# Patient Record
Sex: FEMALE | Race: WHITE | Employment: OTHER | URBAN - METROPOLITAN AREA
[De-identification: names, ages, dates, MRNs, and addresses within clinical notes are randomized per-mention and may not be internally consistent; named-entity substitution may affect disease eponyms.]

---

## 2017-02-10 RX ORDER — OXYBUTYNIN CHLORIDE 10 MG/1
TABLET, EXTENDED RELEASE ORAL
Qty: 30 TABLET | Refills: 0 | Status: SHIPPED | OUTPATIENT
Start: 2017-02-10 | End: 2017-03-24

## 2017-02-10 NOTE — TELEPHONE ENCOUNTER
Requesting Oxybutynin   LOV: 11/22/16 for pre op  RTC: post surgery   Last Labs: n/a   Filled: 7/6/16 #30 with 5 refills    Future Appointments  Date Time Provider Haleigh Devine   3/15/2017 11:30 AM Jayla Lemus MD [facility] [address] [facility] [address]       Med refilled

## 2017-03-15 ENCOUNTER — OFFICE VISIT (OUTPATIENT)
Dept: FAMILY MEDICINE CLINIC | Facility: CLINIC | Age: 67
End: 2017-03-15

## 2017-03-15 VITALS
HEART RATE: 72 BPM | WEIGHT: 137.38 LBS | SYSTOLIC BLOOD PRESSURE: 110 MMHG | BODY MASS INDEX: 24.34 KG/M2 | RESPIRATION RATE: 16 BRPM | TEMPERATURE: 97 F | HEIGHT: 63 IN | DIASTOLIC BLOOD PRESSURE: 62 MMHG

## 2017-03-15 DIAGNOSIS — N39.46 MIXED INCONTINENCE URGE AND STRESS: ICD-10-CM

## 2017-03-15 DIAGNOSIS — Z72.0 TOBACCO ABUSE: ICD-10-CM

## 2017-03-15 DIAGNOSIS — Z28.21 INFLUENZA VACCINATION DECLINED: Primary | ICD-10-CM

## 2017-03-15 DIAGNOSIS — R45.89 DEPRESSED AFFECT: ICD-10-CM

## 2017-03-15 DIAGNOSIS — E78.2 MIXED HYPERLIPIDEMIA: ICD-10-CM

## 2017-03-15 DIAGNOSIS — Z98.890 HISTORY OF BACK SURGERY: ICD-10-CM

## 2017-03-15 DIAGNOSIS — R20.0 NUMBNESS AND TINGLING: ICD-10-CM

## 2017-03-15 DIAGNOSIS — Z12.11 SCREENING FOR COLON CANCER: ICD-10-CM

## 2017-03-15 DIAGNOSIS — E53.8 LOW VITAMIN B12 LEVEL: ICD-10-CM

## 2017-03-15 DIAGNOSIS — E55.9 VITAMIN D DEFICIENCY: ICD-10-CM

## 2017-03-15 DIAGNOSIS — H52.201 ASTIGMATISM OF RIGHT EYE: ICD-10-CM

## 2017-03-15 DIAGNOSIS — R20.2 NUMBNESS AND TINGLING: ICD-10-CM

## 2017-03-15 DIAGNOSIS — Z00.00 ANNUAL PHYSICAL EXAM: ICD-10-CM

## 2017-03-15 DIAGNOSIS — I83.90 VARICOSE VEIN OF LEG: ICD-10-CM

## 2017-03-15 DIAGNOSIS — G89.29 CHRONIC MIDLINE LOW BACK PAIN WITH SCIATICA, SCIATICA LATERALITY UNSPECIFIED: ICD-10-CM

## 2017-03-15 DIAGNOSIS — M54.40 CHRONIC MIDLINE LOW BACK PAIN WITH SCIATICA, SCIATICA LATERALITY UNSPECIFIED: ICD-10-CM

## 2017-03-15 PROCEDURE — 96160 PT-FOCUSED HLTH RISK ASSMT: CPT | Performed by: INTERNAL MEDICINE

## 2017-03-15 NOTE — PROGRESS NOTES
Dl Ren is a 74year old female who presents for a MA Supervisit.         Patient Care Team: Patient Care Team:  Severiano Sing, MD as PCP - General (Internal Medicine)  Kira Chacon MD as Consulting Physician ([address] [place] [address])  Micheal Granado MD (Internal Med 204* 01/25/2016   CHOLEST 181 02/13/2015       Lab Results  Component Value Date   HDL 54 01/25/2016   HDL 44* 02/13/2015       Lab Results  Component Value Date   TRIG 78 01/25/2016   TRIG 79 02/13/2015       Lab Results  Component Value Date   * 0 activities? : No    Memory Problems?: Yes (due to stress)      Fall/Risk Assessment     Do you have 3 or more medical conditions?: 1-Yes    Have you fallen in the last 12 months?: 0-No    Do you accidently lose urine?: 0-No    Do you have difficulty seeing every 10 years Colonoscopy,10 Years due on 03/29/1998 Update Health Maintenance if applicable    Flex Sigmoidoscopy Screen every 5 years No results found for this or any previous visit. No flowsheet data found.      Fecal Occult Blood Annually No results fo Date Value   11/22/2016 0.81   01/25/2016 0.83    No flowsheet data found. Digoxin Serum Conc  Annually No results found for: DIGOXIN No flowsheet data found.     Diabetes      HgbA1C  Annually HEMOGLOBIN A1C (% of total Hgb)   Date Value   02/13/2015 family history of Pyloic Stenosis in siblings      SOCIAL HISTORY:     Smoking Status: Current Every Day Smoker        Packs/Day: 0.50  Years:           Types: Cigarettes    Smokeless Status: Never Used                        Comment: 1 pack every 4 days n grossly intact    ASSESSMENT AND OTHER RELEVANT CHRONIC CONDITIONS:   Gisela Del Valle is a 74year old female who presents for a Medicare Assessment.      PLAN SUMMARY:   Diagnoses and all orders for this visit:    Influenza vaccination declined  -     Flu Helen Paredes Pneumococcal, Zoster, Tetanus     Immunization History   Administered Date(s) Administered   • TDAP 01/25/2016

## 2017-03-17 NOTE — PATIENT INSTRUCTIONS
Gisela LORRAINE Browning's SCREENING SCHEDULE   Tests on this list are recommended by your physician but may not be covered, or covered at this frequency, by your insurer. Please check with your insurance carrier before scheduling to verify coverage.    PREVENTATIVE SE to Age 76     Colonoscopy Screen   Covered every 10 years- more often if abnormal Colonoscopy,10 Years due on 03/29/1998 Update Health Maintenance if applicable    Flex Sigmoidoscopy Screen  Covered every 5 years No results found for this or any previous v Pneumococcal 13 (Prevnar)  Covered Once after 65 No orders found for this or any previous visit. Please get once after your 65th birthday    Pneumococcal 23 (Pneumovax)  Covered Once after 65 No orders found for this or any previous visit.  Please get once

## 2017-03-24 NOTE — TELEPHONE ENCOUNTER
Requesting Oxybutynin Chloride ER  LOV: 3/15/17  RTC: 6mo  Last Labs:   Filled: 2/10/17 #30 with 0 refills    No future appointments.     Pended if okay to refill

## 2017-03-27 RX ORDER — OXYBUTYNIN CHLORIDE 10 MG/1
TABLET, EXTENDED RELEASE ORAL
Qty: 30 TABLET | Refills: 0 | Status: SHIPPED | OUTPATIENT
Start: 2017-03-27 | End: 2017-05-16

## 2017-03-28 ENCOUNTER — APPOINTMENT (OUTPATIENT)
Dept: LAB | Facility: HOSPITAL | Age: 67
End: 2017-03-28
Attending: INTERNAL MEDICINE
Payer: MEDICARE

## 2017-03-28 DIAGNOSIS — Z12.11 SCREENING FOR COLON CANCER: ICD-10-CM

## 2017-03-28 PROCEDURE — 82272 OCCULT BLD FECES 1-3 TESTS: CPT

## 2017-03-31 NOTE — TELEPHONE ENCOUNTER
Received a refill requested (3rd request) for Oxybutynin ER 10mg #30.  Per refill below it was sent on 3/27/17 for #30, called [facility] spoke with Ivelisse Negron she states they never received it, since it was approved by Dr. Lennette Bosworth on 3/27/17 a verbal was given for

## 2017-05-17 NOTE — TELEPHONE ENCOUNTER
Requesting oxybutynin and Duloxetine  LOV: 3/15/17  RTC: 6 months  Last Labs: n/a  Filled: 3/27/17 #30 with 0 refills Oxybutynin  Filled: 7/6/16 #30 with 5 refills of duloxetine    No future appointments.     Patient said she has not been taking Duloxetine

## 2017-05-18 RX ORDER — DULOXETIN HYDROCHLORIDE 30 MG/1
CAPSULE, DELAYED RELEASE ORAL
Qty: 30 CAPSULE | Refills: 5 | Status: SHIPPED | OUTPATIENT
Start: 2017-05-18

## 2017-05-18 RX ORDER — OXYBUTYNIN CHLORIDE 10 MG/1
TABLET, EXTENDED RELEASE ORAL
Qty: 30 TABLET | Refills: 5 | Status: SHIPPED | OUTPATIENT
Start: 2017-05-18

## 2017-08-09 ENCOUNTER — PATIENT OUTREACH (OUTPATIENT)
Dept: FAMILY MEDICINE CLINIC | Facility: CLINIC | Age: 67
End: 2017-08-09

## 2017-08-09 NOTE — PROGRESS NOTES
Patient is due for a mammogram. LM for patient to call back to discuss.     Time started: 3:04    Time ended: 3:06    Total time spent on chart: 2 min

## 2018-06-13 ENCOUNTER — PATIENT OUTREACH (OUTPATIENT)
Dept: FAMILY MEDICINE CLINIC | Facility: CLINIC | Age: 68
End: 2018-06-13

## 2018-10-18 ENCOUNTER — PATIENT OUTREACH (OUTPATIENT)
Dept: FAMILY MEDICINE CLINIC | Facility: CLINIC | Age: 68
End: 2018-10-18

## 2019-01-25 ENCOUNTER — TELEPHONE (OUTPATIENT)
Dept: FAMILY MEDICINE CLINIC | Facility: CLINIC | Age: 69
End: 2019-01-25

## 2019-02-28 ENCOUNTER — TELEPHONE (OUTPATIENT)
Dept: FAMILY MEDICINE CLINIC | Facility: CLINIC | Age: 69
End: 2019-02-28

## 2019-03-08 ENCOUNTER — TELEPHONE (OUTPATIENT)
Dept: FAMILY MEDICINE CLINIC | Facility: CLINIC | Age: 69
End: 2019-03-08

## 2019-03-08 NOTE — TELEPHONE ENCOUNTER
Called patient and l/m vc mail. Needs physical and colorectal screening.  Lluvia has left 2 messages and sent msg via my chart

## 2019-04-05 ENCOUNTER — TELEPHONE (OUTPATIENT)
Dept: FAMILY MEDICINE CLINIC | Facility: CLINIC | Age: 69
End: 2019-04-05

## 2019-04-05 NOTE — TELEPHONE ENCOUNTER
We received returned certified mail with a new address in [place]. I updated the address in reg.

## 2024-07-03 ENCOUNTER — APPOINTMENT (OUTPATIENT)
Dept: PRIMARY CARE | Facility: CLINIC | Age: 74
End: 2024-07-03
Payer: MEDICARE

## 2024-07-03 VITALS
TEMPERATURE: 97.1 F | SYSTOLIC BLOOD PRESSURE: 130 MMHG | HEART RATE: 98 BPM | OXYGEN SATURATION: 84 % | DIASTOLIC BLOOD PRESSURE: 68 MMHG | WEIGHT: 124 LBS

## 2024-07-03 DIAGNOSIS — E78.2 MIXED HYPERLIPIDEMIA: ICD-10-CM

## 2024-07-03 DIAGNOSIS — N32.81 OVERACTIVE BLADDER: ICD-10-CM

## 2024-07-03 DIAGNOSIS — Z00.00 MEDICARE ANNUAL WELLNESS VISIT, SUBSEQUENT: ICD-10-CM

## 2024-07-03 DIAGNOSIS — I73.9 PERIPHERAL VASCULAR DISEASE, UNSPECIFIED (CMS-HCC): ICD-10-CM

## 2024-07-03 DIAGNOSIS — F32.0 MILD MAJOR DEPRESSION (CMS-HCC): ICD-10-CM

## 2024-07-03 DIAGNOSIS — G30.1 MODERATE LATE ONSET ALZHEIMER'S DEMENTIA WITHOUT BEHAVIORAL DISTURBANCE, PSYCHOTIC DISTURBANCE, MOOD DISTURBANCE, OR ANXIETY (MULTI): Primary | ICD-10-CM

## 2024-07-03 DIAGNOSIS — F02.B0 MODERATE LATE ONSET ALZHEIMER'S DEMENTIA WITHOUT BEHAVIORAL DISTURBANCE, PSYCHOTIC DISTURBANCE, MOOD DISTURBANCE, OR ANXIETY (MULTI): Primary | ICD-10-CM

## 2024-07-03 PROBLEM — T14.8XXA BRUISING: Status: RESOLVED | Noted: 2023-07-12 | Resolved: 2024-07-03

## 2024-07-03 PROBLEM — R29.6 FREQUENT FALLS: Status: RESOLVED | Noted: 2023-07-12 | Resolved: 2024-07-03

## 2024-07-03 PROBLEM — S46.911A MUSCLE STRAIN OF RIGHT UPPER ARM: Status: RESOLVED | Noted: 2024-07-03 | Resolved: 2024-07-03

## 2024-07-03 PROBLEM — F32.A DEPRESSION: Status: ACTIVE | Noted: 2024-07-03

## 2024-07-03 PROBLEM — R29.898 RIGHT ARM WEAKNESS: Status: RESOLVED | Noted: 2024-07-03 | Resolved: 2024-07-03

## 2024-07-03 PROBLEM — J43.2 CENTRILOBULAR EMPHYSEMA (MULTI): Status: RESOLVED | Noted: 2024-02-01 | Resolved: 2024-07-03

## 2024-07-03 PROBLEM — L98.9 HEEL SORE: Status: RESOLVED | Noted: 2024-04-26 | Resolved: 2024-07-03

## 2024-07-03 PROBLEM — H52.201 ASTIGMATISM OF RIGHT EYE: Status: ACTIVE | Noted: 2017-03-15

## 2024-07-03 PROBLEM — D69.2 OTHER NONTHROMBOCYTOPENIC PURPURA (CMS-HCC): Status: RESOLVED | Noted: 2023-07-12 | Resolved: 2024-07-03

## 2024-07-03 PROBLEM — E55.9 VITAMIN D DEFICIENCY: Status: ACTIVE | Noted: 2017-03-15

## 2024-07-03 PROBLEM — D69.2 OTHER NONTHROMBOCYTOPENIC PURPURA (CMS-HCC): Status: ACTIVE | Noted: 2023-07-12

## 2024-07-03 PROCEDURE — 99213 OFFICE O/P EST LOW 20 MIN: CPT | Performed by: FAMILY MEDICINE

## 2024-07-03 PROCEDURE — 1170F FXNL STATUS ASSESSED: CPT | Performed by: FAMILY MEDICINE

## 2024-07-03 PROCEDURE — 1159F MED LIST DOCD IN RCRD: CPT | Performed by: FAMILY MEDICINE

## 2024-07-03 PROCEDURE — 1160F RVW MEDS BY RX/DR IN RCRD: CPT | Performed by: FAMILY MEDICINE

## 2024-07-03 PROCEDURE — 4004F PT TOBACCO SCREEN RCVD TLK: CPT | Performed by: FAMILY MEDICINE

## 2024-07-03 PROCEDURE — G0439 PPPS, SUBSEQ VISIT: HCPCS | Performed by: FAMILY MEDICINE

## 2024-07-03 PROCEDURE — 1124F ACP DISCUSS-NO DSCNMKR DOCD: CPT | Performed by: FAMILY MEDICINE

## 2024-07-03 RX ORDER — OXYBUTYNIN CHLORIDE 10 MG/1
1 TABLET, EXTENDED RELEASE ORAL DAILY
COMMUNITY
Start: 2017-05-18

## 2024-07-03 RX ORDER — MEMANTINE HYDROCHLORIDE 10 MG/1
10 TABLET ORAL 2 TIMES DAILY
Qty: 180 TABLET | Refills: 3 | Status: SHIPPED | OUTPATIENT
Start: 2024-07-03 | End: 2025-07-03

## 2024-07-03 RX ORDER — DONEPEZIL HYDROCHLORIDE 10 MG/1
10 TABLET, FILM COATED ORAL NIGHTLY
Qty: 90 TABLET | Refills: 3 | Status: SHIPPED | OUTPATIENT
Start: 2024-07-03 | End: 2025-07-03

## 2024-07-03 ASSESSMENT — ACTIVITIES OF DAILY LIVING (ADL)
DOING_HOUSEWORK: TOTAL CARE
GROCERY_SHOPPING: TOTAL CARE
MANAGING_FINANCES: TOTAL CARE
TAKING_MEDICATION: TOTAL CARE
DRESSING: INDEPENDENT
BATHING: INDEPENDENT

## 2024-07-03 ASSESSMENT — PATIENT HEALTH QUESTIONNAIRE - PHQ9
SUM OF ALL RESPONSES TO PHQ9 QUESTIONS 1 AND 2: 1
2. FEELING DOWN, DEPRESSED OR HOPELESS: SEVERAL DAYS
1. LITTLE INTEREST OR PLEASURE IN DOING THINGS: NOT AT ALL

## 2024-07-03 NOTE — PROGRESS NOTES
Subjective   Reason for Visit: Dorothy Chan is an 74 y.o. female here for a Medicare Wellness visit.     Past Medical, Surgical, and Family History reviewed and updated in chart.    Reviewed all medications by prescribing practitioner or clinical pharmacist (such as prescriptions, OTCs, herbal therapies and supplements) and documented in the medical record.    Very pleasant 74-year-old with moderate severe dementia has had for several years strong family history her brother sister and parents all suffer from the disease started developing several years ago she is now full 24-hour care her daughter is her main caregiver Rosario and is trying to keep her at home  That she resists bathing has not been wandering is unable to cook her communication is very poor and she is not recognizing some family members she gets a little combative when they are encouraging her to get in the shower for hygiene she is not she is a smoker and has started doing that again since her memory has gone bad family is asking if there is any medication to help to possibly maintain function  She has a history of hyperlipidemia but has not been complaining of any chest pain chest tightness shortness breath or exertional symptoms does have overactive bladder has had that since menopause and has gotten significantly worse since she has been being treated for the dementia was since the onset of the dementia has gotten significantly worse running to the bathroom frequently        Patient Self Assessment of Health Status  Patient Self Assessment: Fair    Nutrition and Exercise  Current Diet: Unhealthy Diet  Adequate Fluid Intake: No  Caffeine: No  Exercise Frequency: No Exercise    Functional Ability/Level of Safety  Cognitive Impairment Observed: Cognitive impairment observed  Cognitive Impairment Reported: Cognitive impairment reported by patient or family    Home Safety Risk Factors: None         Patient Care Team:  Lidia Soto MD as PCP - General  (Family Medicine)     Review of Systems  Constitutional: no chills, no fever and no night sweats.   Eyes: no blurred vision and no eyesight problems.   ENT: no hearing loss, no nasal congestion, no nasal discharge, no hoarseness and no sore throat.   Cardiovascular: no chest pain, no intermittent leg claudication, no lower extremity edema, no palpitations and no syncope.   Respiratory: no cough, no shortness of breath during exertion, no shortness of breath at rest and no wheezing.   Gastrointestinal: no abdominal pain, no blood in stools, no constipation, no diarrhea, no melena, no nausea, no rectal pain and no vomiting.   Genitourinary: no dysuria, no change in urinary frequency, no urinary hesitancy, no feelings of urinary urgency and no vaginal discharge.   Musculoskeletal: no arthralgias,  no back pain and no myalgias.   Integumentary: no new skin lesions and no rashes.   Neurological: no difficulty walking, no headache, no limb weakness, no numbness and no tingling.   Psychiatric: no anxiety, no depression, no anhedonia and no substance use disorders.   Endocrine: no recent weight gain and no recent weight loss.   Hematologic/Lymphatic: no tendency for easy bruising and no swollen glands .    Medicare Wellness Billing Compliance Satisfied    *This is a visual tool to show completion of required items on the day of the visit. Green checks will only appear on the date of visit.      Objective   Vitals:  /68   Pulse 98   Temp 36.2 °C (97.1 °F)   Wt 56.2 kg (124 lb)   SpO2 (!) 84%       Physical Exam  The patient appeared well nourished and normally developed. Vital signs as documented. Head exam is unremarkable. No scleral icterus or corneal arcus noted.  Pupils are equal round reactive to light extraocular movements are intact no hemorrhages noted on funduscopic exam mouth mucous membranes are moist no exudates ears canals clear TMs are gray pearly not injected nose no rhinorrhea or epistaxis Neck is  without jugular venous distension, thyromegaly, or carotid bruits. Carotid upstrokes are brisk bilaterally. Lungs are clear to auscultation and percussion. Cardiac exam reveals the PMI to be normally sized and situated. Rhythm is regular. First and second heart sounds normal. No murmurs, rubs or gallops. Abdominal exam reveals normal bowel sounds, no masses, no organomegaly and no aortic enlargement. Extremities are nonedematous and both femoral and pedal pulses are normal.  Neurologic exam DTRs are equal bilaterally no focal deficits strength is symmetrical heme lymph no palpable lymph nodes in the neck axilla or groin  Pleasantly demented   Assessment/Plan   Problem List Items Addressed This Visit       Mixed hyperlipidemia    Relevant Orders    Lipid Panel    Moderate late onset Alzheimer's dementia without behavioral disturbance, psychotic disturbance, mood disturbance, or anxiety (Multi) - Primary    Overview     Last Assessment & Plan:    Formatting of this note might be different from the original.   Patient presents w/ daughter.    Daughter is concerned about mothers mental health. She describes mother having memory issues, not wanting to leave daughters side, not caring for self, tremors, confusion.   Daughter asking for referral to specialist.         Current Assessment & Plan     Worsening dementia  Begin memantine and donepezil  Recommend caregiver support  Follow-up in 6 months         Relevant Medications    memantine (Namenda) 10 mg tablet    donepezil (Aricept) 10 mg tablet    Peripheral vascular disease, unspecified (CMS-HCC)    Overview     Formatting of this note might be different from the original.   Noted by [employer]  last documented on [id number]         Current Assessment & Plan     Currently stable with no major issues  Continue to monitor yearly         Overactive bladder    Current Assessment & Plan     Complicated by the dementia  Continue oxybutynin  Follow-up yearly  Bladder  hygiene discussed         Mild major depression (CMS-[facility])    Current Assessment & Plan     Currently stable and controlled  No issues  Continue to monitor symptoms         Medicare annual wellness visit, subsequent    Current Assessment & Plan     Otherwise unremarkable physical exam in a dementia patient  Schedule mammogram  Biometric screening  Recommend two-part zoster and RSV  Continue annual exams

## 2024-07-08 PROBLEM — Z00.00 MEDICARE ANNUAL WELLNESS VISIT, SUBSEQUENT: Status: ACTIVE | Noted: 2024-07-08

## 2024-07-08 PROBLEM — F32.0 MILD MAJOR DEPRESSION (CMS-HCC): Status: ACTIVE | Noted: 2024-07-08

## 2024-07-08 PROBLEM — F32.A DEPRESSION: Status: RESOLVED | Noted: 2024-07-03 | Resolved: 2024-07-08

## 2024-07-08 ASSESSMENT — ACTIVITIES OF DAILY LIVING (ADL)
BATHING: DEPENDENT
DRESSING: DEPENDENT
GROCERY_SHOPPING: TOTAL CARE
DOING_HOUSEWORK: TOTAL CARE
TAKING_MEDICATION: TOTAL CARE
MANAGING_FINANCES: TOTAL CARE

## 2024-07-08 ASSESSMENT — PATIENT HEALTH QUESTIONNAIRE - PHQ9
SUM OF ALL RESPONSES TO PHQ9 QUESTIONS 1 AND 2: 0
2. FEELING DOWN, DEPRESSED OR HOPELESS: NOT AT ALL
1. LITTLE INTEREST OR PLEASURE IN DOING THINGS: NOT AT ALL

## 2024-07-08 NOTE — ASSESSMENT & PLAN NOTE
Worsening dementia  Begin memantine and donepezil  Recommend caregiver support  Follow-up in 6 months

## 2024-07-08 NOTE — ASSESSMENT & PLAN NOTE
Otherwise unremarkable physical exam in a dementia patient  Schedule mammogram  Biometric screening  Recommend two-part zoster and RSV  Continue annual exams

## 2024-07-15 ENCOUNTER — TELEPHONE (OUTPATIENT)
Dept: PRIMARY CARE | Facility: CLINIC | Age: 74
End: 2024-07-15
Payer: MEDICARE

## 2024-07-15 DIAGNOSIS — G30.1 MODERATE LATE ONSET ALZHEIMER'S DEMENTIA WITHOUT BEHAVIORAL DISTURBANCE, PSYCHOTIC DISTURBANCE, MOOD DISTURBANCE, OR ANXIETY (MULTI): ICD-10-CM

## 2024-07-15 DIAGNOSIS — F02.B0 MODERATE LATE ONSET ALZHEIMER'S DEMENTIA WITHOUT BEHAVIORAL DISTURBANCE, PSYCHOTIC DISTURBANCE, MOOD DISTURBANCE, OR ANXIETY (MULTI): ICD-10-CM

## 2024-07-17 NOTE — TELEPHONE ENCOUNTER
Call patients daughter and advise to stop the medication   Side effects should stop shortly after stopping meds

## 2024-07-25 LAB
NON-UH HIE CALCULATED LDL CHOLESTEROL: 109 MG/DL (ref 60–130)
NON-UH HIE CHOLESTEROL: 182 MG/DL (ref 100–200)
NON-UH HIE HDL CHOLESTEROL: 51 MG/DL (ref 40–60)
NON-UH HIE TOTAL CHOL/HDL CHOL RATIO: 3.6
NON-UH HIE TRIGLYCERIDES: 110 MG/DL (ref 30–150)

## 2024-07-25 NOTE — TELEPHONE ENCOUNTER
----- Message from Lidia Soto sent at 7/25/2024  1:23 PM EDT -----  Please call Dorothy and tell her that her cholesterol levels are all normal  We can recheck them in 1 year

## 2024-07-25 NOTE — RESULT ENCOUNTER NOTE
Please call Dorothy and tell her that her cholesterol levels are all normal  We can recheck them in 1 year

## 2024-08-28 ENCOUNTER — TELEPHONE (OUTPATIENT)
Dept: PRIMARY CARE | Facility: CLINIC | Age: 74
End: 2024-08-28
Payer: MEDICARE

## 2024-08-28 NOTE — TELEPHONE ENCOUNTER
Dorothy's daughter called looking for [employer] paperwork that needs signed.  We received on 8/6/24 Please advise.

## 2024-09-03 NOTE — TELEPHONE ENCOUNTER
Lissett from [facility] called looking for signed Physician passport waiver.  It is time sensitive. She said it has been 2 months since they sent it. Pt will have to start the process all over again if not received soon. Please call Lissett when we find the form [phone]

## 2024-09-06 ENCOUNTER — OFFICE VISIT (OUTPATIENT)
Dept: PRIMARY CARE | Facility: CLINIC | Age: 74
End: 2024-09-06
Payer: MEDICARE

## 2024-09-06 VITALS
HEART RATE: 75 BPM | BODY MASS INDEX: 24.66 KG/M2 | OXYGEN SATURATION: 91 % | HEIGHT: 62 IN | TEMPERATURE: 96.9 F | SYSTOLIC BLOOD PRESSURE: 135 MMHG | DIASTOLIC BLOOD PRESSURE: 69 MMHG | WEIGHT: 134 LBS

## 2024-09-06 DIAGNOSIS — S09.90XA CLOSED HEAD INJURY, INITIAL ENCOUNTER: ICD-10-CM

## 2024-09-06 DIAGNOSIS — S09.90XA TRAUMATIC INJURY OF HEAD, INITIAL ENCOUNTER: Primary | ICD-10-CM

## 2024-09-06 PROCEDURE — 4004F PT TOBACCO SCREEN RCVD TLK: CPT | Performed by: FAMILY MEDICINE

## 2024-09-06 PROCEDURE — 99214 OFFICE O/P EST MOD 30 MIN: CPT | Performed by: FAMILY MEDICINE

## 2024-09-06 PROCEDURE — 1159F MED LIST DOCD IN RCRD: CPT | Performed by: FAMILY MEDICINE

## 2024-09-06 PROCEDURE — 1160F RVW MEDS BY RX/DR IN RCRD: CPT | Performed by: FAMILY MEDICINE

## 2024-09-06 ASSESSMENT — PATIENT HEALTH QUESTIONNAIRE - PHQ9
SUM OF ALL RESPONSES TO PHQ9 QUESTIONS 1 AND 2: 0
1. LITTLE INTEREST OR PLEASURE IN DOING THINGS: NOT AT ALL
2. FEELING DOWN, DEPRESSED OR HOPELESS: NOT AT ALL

## 2024-09-06 NOTE — PROGRESS NOTES
Subjective   Patient ID: Dorothy Chan is a 74 y.o. female who presents for ER Follow-up (Pt fell went to [facility]).  74-year-old with dementia seen for follow-up emergency room after injury from an accidental fall patient was getting out of bed fell forward hit her head on the TV stand she tends to lean over when she walks due to arthritis in her back her daughter saw her about to fall tried to stop her but was unable to she hit the TV stand and fell down did not lose consciousness suffered a large laceration to her forehead not on any blood thinning medication no headache went to emergency room due to the bleeding CT scan of her head showed a focus of acute hemorrhage adjacent to the right frontal lobe the laceration was repaired she was admitted for observation there was no new intracranial bleed she was stabilized and then discharged home was recommended that she follow-up is here today for stitches removal CT was otherwise stable CT of the neck was unremarkable        Review of Systems  Constitutional: no chills, no fever and no night sweats.   Eyes: no blurred vision and no eyesight problems.   ENT: no hearing loss, no nasal congestion, no nasal discharge, no hoarseness and no sore throat.   Cardiovascular: no chest pain, no intermittent leg claudication, no lower extremity edema, no palpitations and no syncope.   Respiratory: no cough, no shortness of breath during exertion, no shortness of breath at rest and no wheezing.   Gastrointestinal: no abdominal pain, no blood in stools, no constipation, no diarrhea, no melena, no nausea, no rectal pain and no vomiting.   Genitourinary: no dysuria, no change in urinary frequency, no urinary hesitancy, no feelings of urinary urgency and no vaginal discharge.   Musculoskeletal: no arthralgias,  no back pain and no myalgias.   Integumentary: no new skin lesions and no rashes.   Neurological: no difficulty walking, no headache, no limb weakness, no numbness and no tingling.  "  Psychiatric: no anxiety, no depression, no anhedonia and no substance use disorders.   Endocrine: no recent weight gain and no recent weight loss.   Hematologic/Lymphatic: no tendency for easy bruising and no swollen glands .    Objective    /69   Pulse 75   Temp 36.1 °C (96.9 °F)   Ht 1.575 m (5' 2\")   Wt 60.8 kg (134 lb)   SpO2 91%   BMI 24.51 kg/m²    Physical Exam  The patient appeared well nourished and normally developed. Vital signs as documented. Head exam is unremarkable. No scleral icterus or corneal arcus noted.  Pupils are equal round reactive to light extraocular movements are intact no hemorrhages noted on funduscopic exam mouth mucous membranes are moist no exudates ears canals clear TMs are gray pearly not injected nose no rhinorrhea or epistaxis Neck is without jugular venous distension, thyromegaly, or carotid bruits. Carotid upstrokes are brisk bilaterally. Lungs are clear to auscultation and percussion. Cardiac exam reveals the PMI to be normally sized and situated. Rhythm is regular. First and second heart sounds normal. No murmurs, rubs or gallops. Abdominal exam reveals normal bowel sounds, no masses, no organomegaly and no aortic enlargement. Extremities are nonedematous and both femoral and pedal pulses are normal.  Neurologic exam DTRs are equal bilaterally no focal deficits strength is symmetrical heme lymph no palpable lymph nodes in the neck axilla or groin degenerative changes in the joints bruising over the face tenderness over the brow temple zygomatic arch alert and pleasantly demented    Assessment/Plan   Problem List Items Addressed This Visit       Head trauma - Primary   Status post laceration  Suture removed  Patient back to baseline  No evidence of bleeding or worsening hematoma  Continue concussion prophylaxis and treatment  Follow-up in 1 month         Lidia Soto MD  "

## 2024-09-15 PROBLEM — S09.90XA CLOSED HEAD INJURY: Status: ACTIVE | Noted: 2024-09-15

## 2024-09-16 ENCOUNTER — APPOINTMENT (OUTPATIENT)
Dept: NEUROSURGERY | Facility: CLINIC | Age: 74
End: 2024-09-16
Payer: MEDICARE

## 2024-09-18 ENCOUNTER — OFFICE VISIT (OUTPATIENT)
Dept: PRIMARY CARE | Facility: CLINIC | Age: 74
End: 2024-09-18
Payer: MEDICARE

## 2024-09-18 VITALS
BODY MASS INDEX: 24.66 KG/M2 | WEIGHT: 134 LBS | SYSTOLIC BLOOD PRESSURE: 130 MMHG | TEMPERATURE: 98 F | HEART RATE: 86 BPM | HEIGHT: 62 IN | DIASTOLIC BLOOD PRESSURE: 70 MMHG

## 2024-09-18 DIAGNOSIS — K21.9 GASTROESOPHAGEAL REFLUX DISEASE WITHOUT ESOPHAGITIS: Primary | ICD-10-CM

## 2024-09-18 PROCEDURE — 1123F ACP DISCUSS/DSCN MKR DOCD: CPT | Performed by: FAMILY MEDICINE

## 2024-09-18 PROCEDURE — 1158F ADVNC CARE PLAN TLK DOCD: CPT | Performed by: FAMILY MEDICINE

## 2024-09-18 PROCEDURE — 99213 OFFICE O/P EST LOW 20 MIN: CPT | Performed by: FAMILY MEDICINE

## 2024-09-18 RX ORDER — SUCRALFATE 1 G/1
1 TABLET ORAL
Qty: 120 TABLET | Refills: 11 | Status: SHIPPED | OUTPATIENT
Start: 2024-09-18 | End: 2025-09-18

## 2024-09-18 RX ORDER — PANTOPRAZOLE SODIUM 40 MG/1
40 TABLET, DELAYED RELEASE ORAL DAILY
Qty: 30 TABLET | Refills: 1 | Status: SHIPPED | OUTPATIENT
Start: 2024-09-18 | End: 2024-11-17

## 2024-09-18 ASSESSMENT — PATIENT HEALTH QUESTIONNAIRE - PHQ9
2. FEELING DOWN, DEPRESSED OR HOPELESS: NOT AT ALL
SUM OF ALL RESPONSES TO PHQ9 QUESTIONS 1 AND 2: 0
1. LITTLE INTEREST OR PLEASURE IN DOING THINGS: NOT AT ALL

## 2024-09-18 NOTE — PROGRESS NOTES
"Subjective   Patient ID: Dorothy Chan is a 74 y.o. female who presents for Suture / Staple Removal (3 sutures in her head.).  HPI    Review of Systems  Constitutional: no chills, no fever and no night sweats.   Eyes: no blurred vision and no eyesight problems.   ENT: no hearing loss, no nasal congestion, no nasal discharge, no hoarseness and no sore throat.   Cardiovascular: no chest pain, no intermittent leg claudication, no lower extremity edema, no palpitations and no syncope.   Respiratory: no cough, no shortness of breath during exertion, no shortness of breath at rest and no wheezing.   Gastrointestinal: no abdominal pain, no blood in stools, no constipation, no diarrhea, no melena, no nausea, no rectal pain and no vomiting.   Genitourinary: no dysuria, no change in urinary frequency, no urinary hesitancy, no feelings of urinary urgency and no vaginal discharge.   Musculoskeletal: no arthralgias,  no back pain and no myalgias.   Integumentary: no new skin lesions and no rashes.   Neurological: no difficulty walking, no headache, no limb weakness, no numbness and no tingling.   Psychiatric: no anxiety, no depression, no anhedonia and no substance use disorders.   Endocrine: no recent weight gain and no recent weight loss.   Hematologic/Lymphatic: no tendency for easy bruising and no swollen glands .    Objective    /70   Pulse 86   Temp 36.7 °C (98 °F)   Ht 1.575 m (5' 2\")   Wt 60.8 kg (134 lb)   BMI 24.51 kg/m²    Physical Exam  The patient appeared well nourished and normally developed. Vital signs as documented. Head exam is unremarkable. No scleral icterus or corneal arcus noted.  Pupils are equal round reactive to light extraocular movements are intact no hemorrhages noted on funduscopic exam mouth mucous membranes are moist no exudates ears canals clear TMs are gray pearly not injected nose no rhinorrhea or epistaxis Neck is without jugular venous distension, thyromegaly, or carotid bruits. " Carotid upstrokes are brisk bilaterally. Lungs are clear to auscultation and percussion. Cardiac exam reveals the PMI to be normally sized and situated. Rhythm is regular. First and second heart sounds normal. No murmurs, rubs or gallops. Abdominal exam reveals normal bowel sounds, no masses, no organomegaly and no aortic enlargement. Extremities are nonedematous and both femoral and pedal pulses are normal.  Neurologic exam DTRs are equal bilaterally no focal deficits strength is symmetrical heme lymph no palpable lymph nodes in the neck axilla or groin    Assessment/Plan   Problem List Items Addressed This Visit       Gastroesophageal reflux disease without esophagitis - Primary    Relevant Medications    pantoprazole (ProtoNix) 40 mg EC tablet    sucralfate (Carafate) 1 gram tablet            Lidia Soto MD

## 2024-09-25 PROBLEM — K21.9 GASTROESOPHAGEAL REFLUX DISEASE WITHOUT ESOPHAGITIS: Status: ACTIVE | Noted: 2024-09-25

## 2024-09-26 ENCOUNTER — OFFICE VISIT (OUTPATIENT)
Dept: PRIMARY CARE | Facility: CLINIC | Age: 74
End: 2024-09-26
Payer: MEDICARE

## 2024-09-26 VITALS
HEART RATE: 68 BPM | SYSTOLIC BLOOD PRESSURE: 143 MMHG | TEMPERATURE: 97.3 F | HEIGHT: 62 IN | BODY MASS INDEX: 24.51 KG/M2 | OXYGEN SATURATION: 92 % | DIASTOLIC BLOOD PRESSURE: 62 MMHG

## 2024-09-26 DIAGNOSIS — R35.0 FREQUENCY OF URINATION: Primary | ICD-10-CM

## 2024-09-26 DIAGNOSIS — R13.10 DYSPHAGIA, UNSPECIFIED TYPE: ICD-10-CM

## 2024-09-26 PROCEDURE — 1159F MED LIST DOCD IN RCRD: CPT | Performed by: NURSE PRACTITIONER

## 2024-09-26 PROCEDURE — 1123F ACP DISCUSS/DSCN MKR DOCD: CPT | Performed by: NURSE PRACTITIONER

## 2024-09-26 PROCEDURE — 4004F PT TOBACCO SCREEN RCVD TLK: CPT | Performed by: NURSE PRACTITIONER

## 2024-09-26 PROCEDURE — 99213 OFFICE O/P EST LOW 20 MIN: CPT | Performed by: NURSE PRACTITIONER

## 2024-09-26 RX ORDER — SULFAMETHOXAZOLE AND TRIMETHOPRIM 800; 160 MG/1; MG/1
1 TABLET ORAL 2 TIMES DAILY
Qty: 14 TABLET | Refills: 0 | Status: SHIPPED | OUTPATIENT
Start: 2024-09-26 | End: 2024-10-03

## 2024-09-26 ASSESSMENT — ENCOUNTER SYMPTOMS
CONSTITUTIONAL NEGATIVE: 1
WHEEZING: 0
SHORTNESS OF BREATH: 0

## 2024-09-26 NOTE — PROGRESS NOTES
Subjective   Patient ID: Dorothy Chan is a 74 y.o. female who presents for UTI.  Last physical: has appt 7/2025    Does pt want flu shot? no  When did her sx start? 1 week  What are her sx? Freq., back pain from Sx    Also wants to talk about wound on ear- x 1 month        HPI  Difficulty swallowing pills    Check lf ear wound    uti symptoms for  1wk  Frequency but then can't go  Back pain diff than usual  no dysuria, urgency, hematuria, lower abdominal pain, fever, chills, cloudy urine, odor to urine, small amount of urine when urinate, nausea, vomiting, incontinence  selftxt:none    Poc Ua, send culture    No care team member to display     Review of Systems   Constitutional: Negative.    Respiratory:  Negative for shortness of breath and wheezing.    Cardiovascular:  Negative for chest pain.       Objective   Visit Vitals  /82   Pulse 68   Temp 36.3 °C (97.3 °F)      BP Readings from Last 3 Encounters:   09/26/24 167/82   09/18/24 130/70   09/06/24 135/69     Wt Readings from Last 3 Encounters:   09/18/24 60.8 kg (134 lb)   09/06/24 60.8 kg (134 lb)   07/03/24 56.2 kg (124 lb)       Physical Exam  Constitutional:       General: She is not in acute distress.     Appearance: Normal appearance.   Neurological:      Mental Status: She is alert.         Assessment/Plan   Diagnoses and all orders for this visit:  Frequency of urination  -     Urine Culture; Future  -     POCT UA Automated manually resulted; Future  -     sulfamethoxazole-trimethoprim (Bactrim DS) 800-160 mg tablet; Take 1 tablet by mouth 2 times a day for 7 days.  Dysphagia, unspecified type  -     Referral to Gastroenterology; Future

## 2024-09-26 NOTE — PATIENT INSTRUCTIONS
Bring back a urine sample  urine culture result back in 2-3d  push fluids; can try cranberry juice  avoid drinks that irritate the bladder like coffee, alcohol, and soft drinks containing citrus juices or caffeine until your infection has cleared.   rest  tylenol  ibuprofen  heating pad on abdomen  start antibiotic once you give a urine sample; you can stop the antibiotic if the culture comes back negative/normal.  call if sx worsen or change especially fever, chills, or back pain or not starting to get better in 2-3d    Check bp daily  Goal <140/<90  I will call in 1wk to obtain bps    Keep July appt    I will communicate with you via [employer] regarding messages and results. If you need help with this, you can call the support line at [phone].    IT WAS A PLEASURE TO SEE YOU TODAY. THANK YOU FOR CHOOSING US FOR YOUR HEALTHCARE NEEDS.

## 2024-09-27 ENCOUNTER — CLINICAL SUPPORT (OUTPATIENT)
Dept: PRIMARY CARE | Facility: CLINIC | Age: 74
End: 2024-09-27
Payer: MEDICARE

## 2024-09-27 DIAGNOSIS — R35.0 FREQUENCY OF URINATION: ICD-10-CM

## 2024-09-27 LAB
POC APPEARANCE, URINE: ABNORMAL
POC BILIRUBIN, URINE: ABNORMAL
POC BLOOD, URINE: ABNORMAL
POC COLOR, URINE: YELLOW
POC GLUCOSE, URINE: NEGATIVE MG/DL
POC KETONES, URINE: ABNORMAL MG/DL
POC LEUKOCYTES, URINE: ABNORMAL
POC NITRITE,URINE: NEGATIVE
POC PH, URINE: 5.5 PH
POC PROTEIN, URINE: ABNORMAL MG/DL
POC SPECIFIC GRAVITY, URINE: >=1.03
POC UROBILINOGEN, URINE: 0.2 EU/DL

## 2024-09-27 PROCEDURE — 87086 URINE CULTURE/COLONY COUNT: CPT

## 2024-09-27 PROCEDURE — 81003 URINALYSIS AUTO W/O SCOPE: CPT | Performed by: NURSE PRACTITIONER

## 2024-09-28 LAB — BACTERIA UR CULT: NORMAL

## 2024-09-30 ENCOUNTER — TELEPHONE (OUTPATIENT)
Dept: PRIMARY CARE | Facility: CLINIC | Age: 74
End: 2024-09-30
Payer: MEDICARE

## 2024-09-30 NOTE — TELEPHONE ENCOUNTER
----- Message from Jennifer Weber sent at 9/28/2024  8:21 PM EDT -----  No uti noted. If antibiotic is helping pt can finish it.   If pt is still having sx, I will put in a referral to a urologist. She can call [phone] to schedule it.  Let me know.

## 2024-10-02 ENCOUNTER — TELEPHONE (OUTPATIENT)
Dept: PRIMARY CARE | Facility: CLINIC | Age: 74
End: 2024-10-02
Payer: MEDICARE

## 2024-10-02 NOTE — TELEPHONE ENCOUNTER
Rosario came to office and was asking for a medication for her mom to calm her down. She said she is angry and agitated over a lot of things.

## 2024-10-07 DIAGNOSIS — G30.1 MODERATE LATE ONSET ALZHEIMER'S DEMENTIA WITHOUT BEHAVIORAL DISTURBANCE, PSYCHOTIC DISTURBANCE, MOOD DISTURBANCE, OR ANXIETY (MULTI): ICD-10-CM

## 2024-10-07 DIAGNOSIS — Z98.890 HISTORY OF BACK SURGERY: ICD-10-CM

## 2024-10-07 DIAGNOSIS — F02.B0 MODERATE LATE ONSET ALZHEIMER'S DEMENTIA WITHOUT BEHAVIORAL DISTURBANCE, PSYCHOTIC DISTURBANCE, MOOD DISTURBANCE, OR ANXIETY (MULTI): ICD-10-CM

## 2024-10-07 NOTE — TELEPHONE ENCOUNTER
Emily do you know how to order a lift chair through [facility]? Otherwise I will just write a handwritten prescription

## 2024-10-08 ENCOUNTER — TELEPHONE (OUTPATIENT)
Dept: PRIMARY CARE | Facility: CLINIC | Age: 74
End: 2024-10-08
Payer: MEDICARE

## 2024-10-08 NOTE — TELEPHONE ENCOUNTER
Please call pt.  Pt has not viewed the [employer] message below:         Good morning!  Please send me a message with your last 7 blood pressures.

## 2024-10-08 NOTE — LETTER
October 12, 2024     Dorothy Chan  [address] [facility] [facility] [address]    Patient: Dorothy Chan   YOB: 1950   Date of Visit: 10/8/2024         Dear Dorothy,    We sent you a [employer] message and have not heard back from you. Then we made several attempts to contact you by phone regarding obtaining blood pressures but you did not answer and have no voicemail.   Please call to our office at [phone] option 1 so that we can get those numbers from you.          Sincerely,          Jennifer Weber, APRN-CNP

## 2024-11-05 ENCOUNTER — APPOINTMENT (OUTPATIENT)
Dept: PRIMARY CARE | Facility: CLINIC | Age: 74
End: 2024-11-05
Payer: MEDICARE

## 2024-11-06 DIAGNOSIS — K21.9 GASTROESOPHAGEAL REFLUX DISEASE WITHOUT ESOPHAGITIS: ICD-10-CM

## 2024-11-07 RX ORDER — PANTOPRAZOLE SODIUM 40 MG/1
40 TABLET, DELAYED RELEASE ORAL DAILY
Qty: 30 TABLET | Refills: 1 | Status: SHIPPED | OUTPATIENT
Start: 2024-11-07 | End: 2025-01-06

## 2024-11-12 ENCOUNTER — APPOINTMENT (OUTPATIENT)
Dept: PRIMARY CARE | Facility: CLINIC | Age: 74
End: 2024-11-12
Payer: MEDICARE

## 2024-11-12 VITALS
WEIGHT: 134 LBS | TEMPERATURE: 97.3 F | HEART RATE: 114 BPM | HEIGHT: 60 IN | BODY MASS INDEX: 26.31 KG/M2 | DIASTOLIC BLOOD PRESSURE: 82 MMHG | OXYGEN SATURATION: 99 % | SYSTOLIC BLOOD PRESSURE: 134 MMHG

## 2024-11-12 DIAGNOSIS — Z23 ENCOUNTER FOR IMMUNIZATION: ICD-10-CM

## 2024-11-12 DIAGNOSIS — G30.1 MODERATE LATE ONSET ALZHEIMER'S DEMENTIA WITHOUT BEHAVIORAL DISTURBANCE, PSYCHOTIC DISTURBANCE, MOOD DISTURBANCE, OR ANXIETY (MULTI): Primary | ICD-10-CM

## 2024-11-12 DIAGNOSIS — F02.B0 MODERATE LATE ONSET ALZHEIMER'S DEMENTIA WITHOUT BEHAVIORAL DISTURBANCE, PSYCHOTIC DISTURBANCE, MOOD DISTURBANCE, OR ANXIETY (MULTI): Primary | ICD-10-CM

## 2024-11-12 PROCEDURE — 1160F RVW MEDS BY RX/DR IN RCRD: CPT | Performed by: FAMILY MEDICINE

## 2024-11-12 PROCEDURE — 99213 OFFICE O/P EST LOW 20 MIN: CPT | Performed by: FAMILY MEDICINE

## 2024-11-12 PROCEDURE — 1159F MED LIST DOCD IN RCRD: CPT | Performed by: FAMILY MEDICINE

## 2024-11-12 PROCEDURE — G0008 ADMIN INFLUENZA VIRUS VAC: HCPCS | Performed by: FAMILY MEDICINE

## 2024-11-12 PROCEDURE — 1123F ACP DISCUSS/DSCN MKR DOCD: CPT | Performed by: FAMILY MEDICINE

## 2024-11-12 PROCEDURE — 90662 IIV NO PRSV INCREASED AG IM: CPT | Performed by: FAMILY MEDICINE

## 2024-11-12 PROCEDURE — 1158F ADVNC CARE PLAN TLK DOCD: CPT | Performed by: FAMILY MEDICINE

## 2024-11-12 PROCEDURE — 4004F PT TOBACCO SCREEN RCVD TLK: CPT | Performed by: FAMILY MEDICINE

## 2024-11-12 NOTE — PROGRESS NOTES
Subjective   Patient ID: Dorothy Chan is a 74 y.o. female who presents for Dementia (Wants something for anxiety for when she goes to .).  Anxiety about going to   Follow-up dementia  Memantine helps but gets very nervous when he has to go to  does not want to go and gets very confused        Review of Systems  Constitutional: no chills, no fever and no night sweats.   Eyes: no blurred vision and no eyesight problems.   ENT: no hearing loss, no nasal congestion, no nasal discharge, no hoarseness and no sore throat.   Cardiovascular: no chest pain, no intermittent leg claudication, no lower extremity edema, no palpitations and no syncope.   Respiratory: no cough, no shortness of breath during exertion, no shortness of breath at rest and no wheezing.   Gastrointestinal: no abdominal pain, no blood in stools, no constipation, no diarrhea, no melena, no nausea, no rectal pain and no vomiting.   Genitourinary: no dysuria, no change in urinary frequency, no urinary hesitancy, no feelings of urinary urgency and no vaginal discharge.   Musculoskeletal: no arthralgias,  no back pain and no myalgias.   Integumentary: no new skin lesions and no rashes.   Neurological: no difficulty walking, no headache, no limb weakness, no numbness and no tingling.   Psychiatric: no anxiety, no depression, no anhedonia and no substance use disorders.   Endocrine: no recent weight gain and no recent weight loss.   Hematologic/Lymphatic: no tendency for easy bruising and no swollen glands .    Objective    /82   Pulse (!) 114   Temp 36.3 °C (97.3 °F) (Temporal)   Ht 1.524 m (5')   Wt 60.8 kg (134 lb)   SpO2 99%   BMI 26.17 kg/m²    Physical Exam  The patient appeared well nourished and normally developed. Vital signs as documented. Head exam is unremarkable. No scleral icterus or corneal arcus noted.  Pupils are equal round reactive to light extraocular movements are intact no hemorrhages noted on funduscopic exam  mouth mucous membranes are moist no exudates ears canals clear TMs are gray pearly not injected nose no rhinorrhea or epistaxis Neck is without jugular venous distension, thyromegaly, or carotid bruits. Carotid upstrokes are brisk bilaterally. Lungs are clear to auscultation and percussion. Cardiac exam reveals the PMI to be normally sized and situated. Rhythm is regular. First and second heart sounds normal. No murmurs, rubs or gallops. Abdominal exam reveals normal bowel sounds, no masses, no organomegaly and no aortic enlargement. Extremities are nonedematous and both femoral and pedal pulses are normal.  Neurologic exam DTRs are equal bilaterally no focal deficits strength is symmetrical heme lymph no palpable lymph nodes in the neck axilla or groin    Assessment/Plan   Problem List Items Addressed This Visit       Moderate late onset Alzheimer's dementia without behavioral disturbance, psychotic disturbance, mood disturbance, or anxiety (Multi) - Primary    Relevant Medications    risperiDONE (RisperDAL) 0.5 mg tablet     Other Visit Diagnoses       Encounter for immunization        Relevant Orders    Flu vaccine, trivalent, preservative free, HIGH-DOSE, age 65y+ (Fluzone) (Completed)                 Lidia Soto MD

## 2024-11-30 RX ORDER — RISPERIDONE 0.5 MG/1
0.5 TABLET ORAL 2 TIMES DAILY
Qty: 60 TABLET | Refills: 0 | Status: SHIPPED | OUTPATIENT
Start: 2024-11-30 | End: 2024-12-30

## 2025-07-07 ENCOUNTER — APPOINTMENT (OUTPATIENT)
Dept: PRIMARY CARE | Facility: CLINIC | Age: 75
End: 2025-07-07
Payer: MEDICARE

## (undated) NOTE — Clinical Note
04/17/2017        Gisela Gonzalez      Dear Odalis Xiao,    Our records indicate that you have outstanding lab work and or testing that was ordered for you and has not yet been completed:      Occult Blood, Fecal, Immunoassa

## (undated) NOTE — MR AVS SNAPSHOT
[address] [facility] [address]  [place]  [phone]               Thank you for choosing us for your health care visit with Christiano Christine MD.  We are glad to serve you and happy to provide you with [facility] Allergies as of Mar 15, 2017     Chantix [Varenicline]     depression    Penicillins Unknown    Zyban [Bupropion]     depression                Today's Vital Signs     BP Pulse Temp Height Weight BMI    110/62 mmHg 72 97.3 °F (36.3 °C) (Temporal) 63\" 137 chair/bench and a hand held shower head while bathing/showering. BEDROOM:  ? Place light switches within reach of your bed and a night light between the bedroom and bathroom. ? Get up slowly from lying down or sitting if you get dizzy. ?  Keep a working